# Patient Record
Sex: MALE | Race: WHITE
[De-identification: names, ages, dates, MRNs, and addresses within clinical notes are randomized per-mention and may not be internally consistent; named-entity substitution may affect disease eponyms.]

---

## 2019-10-18 ENCOUNTER — HOSPITAL ENCOUNTER (EMERGENCY)
Dept: HOSPITAL 33 - ED | Age: 59
LOS: 1 days | Discharge: ANOTHER HOSPITAL | End: 2019-10-19
Payer: COMMERCIAL

## 2019-10-18 VITALS — OXYGEN SATURATION: 97 %

## 2019-10-18 DIAGNOSIS — I21.4: Primary | ICD-10-CM

## 2019-10-18 LAB
ALBUMIN SERPL-MCNC: 3.9 G/DL (ref 3.5–5)
ALP SERPL-CCNC: 120 U/L (ref 38–126)
ALT SERPL-CCNC: 28 U/L (ref 0–50)
ANION GAP SERPL CALC-SCNC: 11.3 MEQ/L (ref 5–15)
AST SERPL QL: 32 U/L (ref 17–59)
BASOPHILS # BLD AUTO: 0.04 10*3/UL (ref 0–0.4)
BASOPHILS NFR BLD AUTO: 0.4 % (ref 0–0.4)
BILIRUB BLD-MCNC: 0.2 MG/DL (ref 0.2–1.3)
BNP SERPL-MCNC: 41.8 PG/ML (ref 0–900)
BUN SERPL-MCNC: 20 MG/DL (ref 9–20)
CALCIUM SPEC-MCNC: 9.2 MG/DL (ref 8.4–10.2)
CHLORIDE SERPL-SCNC: 104 MMOL/L (ref 98–107)
CO2 SERPL-SCNC: 29 MMOL/L (ref 22–30)
CREAT SERPL-MCNC: 0.9 MG/DL (ref 0.66–1.25)
EOSINOPHIL # BLD AUTO: 0.42 10*3/UL (ref 0–0.5)
GLUCOSE SERPL-MCNC: 144 MG/DL (ref 74–106)
HCT VFR BLD AUTO: 45.7 % (ref 42–50)
HGB BLD-MCNC: 15 GM/DL (ref 12.5–18)
LYMPHOCYTES # SPEC AUTO: 2.88 10*3/UL (ref 1–4.6)
MCH RBC QN AUTO: 31.8 PG (ref 26–32)
MCHC RBC AUTO-ENTMCNC: 32.8 G/DL (ref 32–36)
MONOCYTES # BLD AUTO: 1.23 10*3/UL (ref 0–1.3)
PLATELET # BLD AUTO: 279 K/MM3 (ref 150–450)
POTASSIUM SERPLBLD-SCNC: 4.4 MMOL/L (ref 3.5–5.1)
PROT SERPL-MCNC: 7 G/DL (ref 6.3–8.2)
RBC # BLD AUTO: 4.72 M/MM3 (ref 4.1–5.6)
SODIUM SERPL-SCNC: 141 MMOL/L (ref 137–145)
WBC # BLD AUTO: 9.2 K/MM3 (ref 4–10.5)

## 2019-10-18 PROCEDURE — 96374 THER/PROPH/DIAG INJ IV PUSH: CPT

## 2019-10-18 PROCEDURE — 84484 ASSAY OF TROPONIN QUANT: CPT

## 2019-10-18 PROCEDURE — 93041 RHYTHM ECG TRACING: CPT

## 2019-10-18 PROCEDURE — 99291 CRITICAL CARE FIRST HOUR: CPT

## 2019-10-18 PROCEDURE — 99285 EMERGENCY DEPT VISIT HI MDM: CPT

## 2019-10-18 PROCEDURE — 99292 CRITICAL CARE ADDL 30 MIN: CPT

## 2019-10-18 PROCEDURE — 96376 TX/PRO/DX INJ SAME DRUG ADON: CPT

## 2019-10-18 PROCEDURE — 96372 THER/PROPH/DIAG INJ SC/IM: CPT

## 2019-10-18 PROCEDURE — 36415 COLL VENOUS BLD VENIPUNCTURE: CPT

## 2019-10-18 PROCEDURE — 93005 ELECTROCARDIOGRAM TRACING: CPT

## 2019-10-18 PROCEDURE — 36000 PLACE NEEDLE IN VEIN: CPT

## 2019-10-18 PROCEDURE — 85025 COMPLETE CBC W/AUTO DIFF WBC: CPT

## 2019-10-18 PROCEDURE — 83880 ASSAY OF NATRIURETIC PEPTIDE: CPT

## 2019-10-18 PROCEDURE — 80053 COMPREHEN METABOLIC PANEL: CPT

## 2019-10-18 PROCEDURE — 96375 TX/PRO/DX INJ NEW DRUG ADDON: CPT

## 2019-10-18 PROCEDURE — 71045 X-RAY EXAM CHEST 1 VIEW: CPT

## 2019-10-18 NOTE — ERPHSYRPT
- History of Present Illness


Time Seen by Provider: 10/18/19 22:55


Historian: patient


Exam Limitations: no limitations


Patient Subjective Stated Complaint: pt states, "I was sitting on the couch and 

chest pain/tightness occured.  I got up and walked around, took my b/p which 

was high 194/130."


Triage Nursing Assessment: pt ambulated to rm 3, alert and oriented, pt's wife 

at bedside.  Pt c/o chest pain up to lt shoulder.  Pt states, "My pain is 

better than it was at home".  Lungs clear, heart tones reg, abd lg/obese with 

active bs x4 quad, nontender.  Pt c/o tightness in the chest.


Physician History: 





pt states, "I was sitting on the couch and chest pain/tightness occured.  I got 

up and walked around, took my b/p which was high 194/130."


Timing/Duration: today


Activities at Onset: none


Quality: tightness


Location: substernal, central


Chest Pain Radiation: arm


Severity of Pain-Max: moderate


Severity of Pain-Current: mild


Modifying Factors: Improves With: aspirin


Prior Chest Pain/Cardiac Workup: no prior chest pain


Nitro Today/Relief: no nitro taken today


Aspirin Treatment Today: 81 mg x 2


Allergies/Adverse Reactions: 








latex Adverse Reaction (Verified 10/18/19 22:42)


 





Home Medications: 








Levothyroxine Sodium 50 mcg PO DAILY 10/18/19 [History]


Metformin HCl 500 mg*** [Glucophage 500 MG***] 500 mg PO DAILY 10/18/19 [History

]


Tramadol HCl 50 mg PO [id number] PRN 10/18/19 [History]





Hx Tetanus, Diphtheria Vaccination/Date Given: Yes


Hx Influenza Vaccination/Date Given: No


Hx Pneumococcal Vaccination/Date Given: No


Immunizations Up to Date: Yes





- Review of Systems


Constitutional: No Fever, No Chills


Eyes: No Symptoms


Ears, Nose, & Throat: No Symptoms


Respiratory: No Cough, No Dyspnea


Cardiac: Chest Pain, No Edema, No Syncope


Abdominal/Gastrointestinal: No Abdominal Pain, No Nausea, No Vomiting, No 

Diarrhea


Genitourinary Symptoms: No Dysuria


Musculoskeletal: No Back Pain, No Neck Pain


Skin: No Rash


Neurological: No Dizziness, No Focal Weakness, No Sensory Changes


Psychological: No Symptoms


Endocrine: No Symptoms


All Other Systems: Reviewed and Negative





- Past Medical History


Pertinent Past Medical History: Yes


Neurological History: No Pertinent History


ENT History: No Pertinent History


Cardiac History: No Pertinent History


Respiratory History: No Pertinent History


Endocrine Medical History: Diabetes Type II, Hypoglycemia


Musculoskeletal History: Arthritis, Fractures


GI Medical History: Gallbladder Disease


 History: No Pertinent History


Psycho-Social History: Depression


Male Reproductive Disorders: No Pertinent History


Other Medical History: fx lt hip, gallstone





- Past Surgical History


Past Surgical History: Yes


Neuro Surgical History: No Pertinent History


Cardiac: No Pertinent History


Respiratory: No Pertinent History


Gastrointestinal: No Pertinent History


Genitourinary: No Pertinent History


Musculoskeletal: Orthopedic Surgery


Male Surgical History: No Pertinent History


Other Surgical History: rt carpal tunnel release.  lt hip replacement





- Social History


Smoking Status: Never smoker


Exposure to second hand smoke: Yes


Drug Use: none


Patient Lives Alone: No





- Nursing Vital Signs


Nursing Vital Signs: 


 Initial Vital Signs











Temperature  98.0 F   10/18/19 22:24


 


Pulse Rate  92 H  10/18/19 22:24


 


Respiratory Rate  19   10/18/19 22:24


 


Blood Pressure  170/108   10/18/19 22:24


 


O2 Sat by Pulse Oximetry  97   10/18/19 22:24








 Pain Scale











Pain Intensity                 3

















- Physical Exam


General Appearance: no apparent distress, alert


Eye Exam: PERRL/EOMI, eyes nml inspection


Ears, Nose, Throat Exam: normal ENT inspection, moist mucous membranes


Neck Exam: normal inspection, non-tender, supple, full range of motion


Respiratory Exam: normal breath sounds, lungs clear, No respiratory distress


Cardiovascular Exam: regular rate/rhythm, normal heart sounds


Gastrointestinal/Abdomen Exam: soft, No tenderness, No mass


Back Exam: normal inspection, No CVA tenderness, No vertebral tenderness


Extremity Exam: normal inspection, normal range of motion


Neurologic Exam: alert, oriented x 3, cooperative, normal mood/affect, 

sensation nml, No motor deficits


Skin Exam: normal color, warm, dry


SpO2: 97





- Course


Nursing assessment & vital signs reviewed: Yes


EKG Interpreted by Me: Sinus Rhythm


Rhythm Strip: Normal Sinus Rhythm





- Radiology Exams


  ** Chest


X-ray Interpretation: Reviewed by me, Negative


Ordered Tests: 


 Active Orders 24 hr











 Category Date Time Status


 


 Cardiac Monitor STAT Care  10/18/19 22:26 Active


 


 EKG-ER Only STAT Care  10/18/19 22:25 Active


 


 IV Insertion STAT Care  10/18/19 22:36 Active


 


 CHEST 1 VIEW (PORTABLE) Stat Exams  10/18/19 22:25 Taken


 


 CBC W DIFF Stat Lab  10/18/19 22:58 Completed


 


 CMP Stat Lab  10/18/19 22:58 Completed


 


 NT PRO BNP Stat Lab  10/18/19 22:58 Completed


 


 TROPONIN Q3H Lab  10/18/19 22:58 Completed


 


 TROPONIN Q3H Lab  10/19/19 01:30 Ordered


 


 TROPONIN Q3H Lab  10/19/19 04:30 Ordered


 


 TROPONIN Q3H Lab  10/19/19 07:30 Ordered


 


 TROPONIN Q3H Lab  10/19/19 10:30 Ordered








Medication Summary











Generic Name Dose Route Start Last Admin





  Trade Name Freq  PRN Reason Stop Dose Admin


 


Sodium Chloride  1,000 mls @ 50 mls/hr  10/18/19 22:30  10/18/19 22:45





  Sodium Chloride 0.9% 1000 Ml  IV  11/17/19 22:29  50 mls/hr





  .Q20H CLIF   Administration





     





     





     





     














Discontinued Medications














Generic Name Dose Route Start Last Admin





  Trade Name Freq  PRN Reason Stop Dose Admin


 


Metoprolol Tartrate  2.5 mg  10/18/19 22:58  10/18/19 23:38





  Lopressor 5 Mg/5 Ml Injection***  IV  10/18/19 22:59  2.5 mg





  STAT ONE   Administration





     





     





     





     


 


Metoprolol Tartrate  Confirm  10/18/19 23:33  





  Lopressor 5 Mg/5 Ml Injection***  Administered  10/18/19 23:34  





  Dose   





  5 mg   





  IV   





  .STK-MED ONE   





     





     





     





     


 


Morphine Sulfate  4 mg  10/18/19 23:22  10/18/19 23:39





  Morphine Sulfate 4 Mg Inj***  IV  10/18/19 23:23  4 mg





  STAT ONE   Administration





     





     





     





     


 


Morphine Sulfate  Confirm  10/18/19 23:33  





  Morphine Sulfate 4 Mg Inj***  Administered  10/18/19 23:34  





  Dose   





  4 mg   





  .ROUTE   





  .STK-MED ONE   





     





     





     





     


 


Nitroglycerin  0.4 mg  10/18/19 22:25  10/18/19 22:39





  Nitrostat 0.4 Mg (Ed)***  SL  10/18/19 22:26  0.4 mg





  STAT ONE   Administration





     





     





     





     


 


Nitroglycerin  Confirm  10/18/19 22:37  





  Nitrostat 0.4 Mg (Ed)***  Administered  10/18/19 22:38  





  Dose   





  0.4 mg   





  SL   





  .STK-MED ONE   





     





     





     





     


 


Nitroglycerin  1 gm  10/18/19 22:58  10/18/19 23:34





  Nitro-Bid 2% Ud Packets***  TOP  10/18/19 22:59  1 gm





  STAT ONE   Administration





     





     





     





     


 


Nitroglycerin  Confirm  10/18/19 23:05  





  Nitro-Bid 2% Ud Packets***  Administered  10/18/19 23:06  





  Dose   





  1 gm   





  .ROUTE   





  .STK-MED ONE   





     





     





     





     


 


Nitroglycerin  Confirm  10/18/19 23:33  





  Nitro-Bid 2% Ud Packets***  Administered  10/18/19 23:34  





  Dose   





  1 gm   





  .ROUTE   





  .STK-MED ONE   





     





     





     





     











Lab/Rad Data: 


 Laboratory Result Diagrams





 10/18/19 22:58 





 10/18/19 22:58 





 Laboratory Results











  10/18/19 10/18/19 10/18/19 Range/Units





  22:58 22:58 22:58 


 


WBC    9.2  (4.0-10.5)  K/mm3


 


RBC    4.72  (4.1-5.6)  M/mm3


 


Hgb    15.0  (12.5-18.0)  gm/dl


 


Hct    45.7  (42-50)  %


 


MCV    96.8  ()  fl


 


MCH    31.8  (26-32)  pg


 


MCHC    32.8  (32-36)  g/dl


 


RDW    13.2  (11.5-14.0)  %


 


Plt Count    279  (150-450)  K/mm3


 


MPV    9.2  (6-9.5)  fl


 


Gran %    50.3  (36.0-66.0)  %


 


Eos # (Auto)    0.42  (0-0.5)  


 


Absolute Lymphs (auto)    2.88  (1.0-4.6)  


 


Absolute Monos (auto)    1.23  (0.0-1.3)  


 


Lymphocytes %    31.3  (24.0-44.0)  %


 


Monocytes %    13.4 H  (0.0-12.0)  %


 


Eosinophils %    4.6  (0.00-5.0)  %


 


Basophils %    0.4  (0.0-0.4)  %


 


Absolute Granulocytes    4.64  (1.4-6.9)  


 


Basophils #    0.04  (0-0.4)  


 


Sodium   141   (137-145)  mmol/L


 


Potassium   4.4   (3.5-5.1)  mmol/L


 


Chloride   104   ()  mmol/L


 


Carbon Dioxide   29   (22-30)  mmol/L


 


Anion Gap   11.3   (5-15)  MEQ/L


 


BUN   20   (9-20)  mg/dL


 


Creatinine   0.90   (0.66-1.25)  mg/dL


 


Estimated GFR   > 60.0   ML/MIN


 


Glucose   144 H   ()  mg/dL


 


Calcium   9.2   (8.4-10.2)  mg/dL


 


Total Bilirubin   0.20   (0.2-1.3)  mg/dL


 


AST   32   (17-59)  U/L


 


ALT   28   (0-50)  U/L


 


Alkaline Phosphatase   120   ()  U/L


 


Troponin I  0.120 H*    (0.000-0.034)  ng/mL


 


NT-Pro-B Natriuret Pep   41.8   (0-900)  pg/mL


 


Serum Total Protein   7.0   (6.3-8.2)  g/dL


 


Albumin   3.9   (3.5-5.0)  g/dL














- Progress


Progress: improved


Air Movement: good


Blood Culture(s) Obtained: No


Antibiotics given: No


Discussed with Dr.: Other ([facility] Dr Choudhury)


Will see patient in: other


Counseled pt/family regarding: lab results, diagnosis, need for follow-up, rad 

results





- Departure


Departure Disposition: Transfer ([facility] ER)


Clinical Impression: 


 Non-ST elevation MI (NSTEMI)





Condition: Fair


Critical Care Time: Yes


Critical Care Time(excluding separately billable procedures): Critical 30-74 

mins


Referrals: 


CHARLENE WISE MD [Primary Care Provider] -

## 2019-10-19 VITALS — DIASTOLIC BLOOD PRESSURE: 94 MMHG | SYSTOLIC BLOOD PRESSURE: 146 MMHG | HEART RATE: 92 BPM

## 2019-10-19 NOTE — XRAY
Indication: Chest pain.



Comparison: None



Portable chest is clear.  Heart and mediastinal structures within normal

limits.  Bony thorax intact with mild degenerative changes.



Impression: Nonacute chest.